# Patient Record
Sex: MALE
[De-identification: names, ages, dates, MRNs, and addresses within clinical notes are randomized per-mention and may not be internally consistent; named-entity substitution may affect disease eponyms.]

---

## 2024-08-05 PROBLEM — Z00.00 ENCOUNTER FOR PREVENTIVE HEALTH EXAMINATION: Status: ACTIVE | Noted: 2024-08-05

## 2024-08-06 ENCOUNTER — APPOINTMENT (OUTPATIENT)
Dept: PULMONOLOGY | Facility: CLINIC | Age: 34
End: 2024-08-06

## 2024-08-06 PROBLEM — Z02.1 PRE-EMPLOYMENT EXAMINATION: Status: ACTIVE | Noted: 2024-08-06

## 2024-08-06 PROCEDURE — 99204 OFFICE O/P NEW MOD 45 MIN: CPT | Mod: 25

## 2024-08-06 PROCEDURE — 36415 COLL VENOUS BLD VENIPUNCTURE: CPT

## 2024-08-06 NOTE — PLAN
[FreeTextEntry1] : Venipuncture with labs drawn in office. Preemployment physical form filled for Sven. Advised him to return for annual physical examination in 2 months.

## 2024-08-06 NOTE — HISTORY OF PRESENT ILLNESS
[FreeTextEntry8] : Sven is a pleasant 34-year-old gentleman without significant past medical history, he came in for preemployment physical today.  He has no complaints.  He needs to have pain while in physical paper filled. He is a non-smoker.

## 2024-10-16 ENCOUNTER — APPOINTMENT (OUTPATIENT)
Dept: PULMONOLOGY | Facility: CLINIC | Age: 34
End: 2024-10-16

## 2024-12-09 ENCOUNTER — EMERGENCY (EMERGENCY)
Facility: HOSPITAL | Age: 34
LOS: 0 days | Discharge: HOME | End: 2024-12-10
Attending: EMERGENCY MEDICINE
Payer: MEDICAID

## 2024-12-09 VITALS
SYSTOLIC BLOOD PRESSURE: 146 MMHG | WEIGHT: 169.98 LBS | OXYGEN SATURATION: 100 % | HEART RATE: 99 BPM | HEIGHT: 69 IN | RESPIRATION RATE: 16 BRPM | TEMPERATURE: 98 F | DIASTOLIC BLOOD PRESSURE: 107 MMHG

## 2024-12-09 VITALS
DIASTOLIC BLOOD PRESSURE: 85 MMHG | HEART RATE: 83 BPM | RESPIRATION RATE: 18 BRPM | OXYGEN SATURATION: 98 % | TEMPERATURE: 99 F | SYSTOLIC BLOOD PRESSURE: 136 MMHG

## 2024-12-09 DIAGNOSIS — M25.511 PAIN IN RIGHT SHOULDER: ICD-10-CM
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
## 2024-12-09 DIAGNOSIS — S43.004A UNSPECIFIED DISLOCATION OF RIGHT SHOULDER JOINT, INITIAL ENCOUNTER: ICD-10-CM
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
## 2024-12-09 PROCEDURE — 73030 X-RAY EXAM OF SHOULDER: CPT | Mod: 26,RT

## 2024-12-09 PROCEDURE — 23650 CLTX SHO DSLC W/MNPJ WO ANES: CPT | Mod: 54,RT

## 2024-12-09 PROCEDURE — 99285 EMERGENCY DEPT VISIT HI MDM: CPT | Mod: 57

## 2024-12-09 RX ORDER — IBUPROFEN 200 MG
600 TABLET ORAL ONCE
Refills: 0 | Status: COMPLETED | OUTPATIENT
Start: 2024-12-09 | End: 2024-12-09

## 2024-12-09 RX ADMIN — Medication 600 MILLIGRAM(S): at 22:44

## 2024-12-09 RX ADMIN — Medication 8 MILLIGRAM(S): at 22:45

## 2024-12-09 RX ADMIN — Medication 8 MILLIGRAM(S): at 23:44

## 2024-12-09 RX ADMIN — Medication 600 MILLIGRAM(S): at 23:44

## 2024-12-09 NOTE — ED PROVIDER NOTE - NSFOLLOWUPINSTRUCTIONS_ED_ALL_ED_FT
1) Keep immobilizer in place for 1-2 days  2) Follow-up with Orthopedics  3) Follow up with your primary care doctor  4) Return to the ER for worsening or concerning symptoms

## 2024-12-09 NOTE — ED PROVIDER NOTE - OBJECTIVE STATEMENT
This patient is a 34 year old man right hand dominant who presents to the ER c/o right shoulder pain.  He extended his arm backward as he was moving object and felt the shoulder dislocate.  He reports constant 9/10 in severity worse with movement.

## 2024-12-09 NOTE — ED PROVIDER NOTE - PATIENT PORTAL LINK FT
You can access the FollowMyHealth Patient Portal offered by [facility] by registering at the following website: [email or web address]. By joining [employer]’s FollowMyHealth portal, you will also be able to view your health information using other applications (apps) compatible with our system.

## 2024-12-09 NOTE — ED ADULT TRIAGE NOTE - CHIEF COMPLAINT QUOTE
Patient states that he was moving objects around and felt his right shoulder slip out of place. No  pain medications taken prior to arrival

## 2024-12-09 NOTE — ED PROVIDER NOTE - CARE PROVIDER_API CALL
Danny Cunha  Orthopaedic Sports Medicine  [address] [place] [address], [address] [address]  Phone: [phone]  Fax: [phone]  Follow Up Time:

## 2024-12-09 NOTE — ED ADULT NURSE NOTE - OBJECTIVE STATEMENT
33 yo m no pmhx, nkda c/o R shoulder pain / dislocation after stretching the wrong way. Pt aox3, amb w steady gait. Pt had R arm sling on arrival. Pt c/o R upper extremity DROM, subjective numbness/ tinglign + cap refill. Did not take any pain meds PTA to ED.

## 2024-12-09 NOTE — ED PROVIDER NOTE - CLINICAL SUMMARY MEDICAL DECISION MAKING FREE TEXT BOX
Right shoulder pain after extending arm posteriorly.  No acute distress vitals stable deformity noted at shoulder high suspicion for dislocation.  Will give medication for pain and get Xray to confirm dislocation and r/o fracture Right shoulder pain after extending arm posteriorly.  No acute distress vitals stable deformity noted at shoulder high suspicion for dislocation.  Will give medication for pain and get Xray to confirm dislocation and r/o fracture    Xray + for dislocation  Shoulder reduced and confirmed by Xray    Supportive care and follow-up discussed.

## 2024-12-10 PROCEDURE — 73030 X-RAY EXAM OF SHOULDER: CPT | Mod: 26,RT

## 2024-12-10 NOTE — ED ADULT NURSE REASSESSMENT NOTE - NS ED NURSE REASSESS COMMENT FT1
RN made aware that pt was not in bed. Pt aox3, amb w steady gait. had verbalized that R shoulder feels better and with ROM, Attempted to call Mobile # as in file, and no . MD made aware of situation.

## 2025-04-28 ENCOUNTER — EMERGENCY (EMERGENCY)
Facility: HOSPITAL | Age: 35
LOS: 0 days | Discharge: HOME | End: 2025-04-28
Attending: STUDENT IN AN ORGANIZED HEALTH CARE EDUCATION/TRAINING PROGRAM
Payer: COMMERCIAL

## 2025-04-28 VITALS
RESPIRATION RATE: 14 BRPM | DIASTOLIC BLOOD PRESSURE: 78 MMHG | OXYGEN SATURATION: 99 % | SYSTOLIC BLOOD PRESSURE: 119 MMHG | HEART RATE: 66 BPM | TEMPERATURE: 98 F

## 2025-04-28 VITALS
WEIGHT: 167.99 LBS | OXYGEN SATURATION: 100 % | DIASTOLIC BLOOD PRESSURE: 79 MMHG | TEMPERATURE: 99 F | HEIGHT: 69 IN | RESPIRATION RATE: 16 BRPM | HEART RATE: 82 BPM | SYSTOLIC BLOOD PRESSURE: 119 MMHG

## 2025-04-28 DIAGNOSIS — S22.41XA MULTIPLE FRACTURES OF RIBS, RIGHT SIDE, INITIAL ENCOUNTER FOR CLOSED FRACTURE: ICD-10-CM

## 2025-04-28 DIAGNOSIS — M62.838 OTHER MUSCLE SPASM: ICD-10-CM
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
## 2025-04-28 DIAGNOSIS — M25.511 PAIN IN RIGHT SHOULDER: ICD-10-CM
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
## 2025-04-28 DIAGNOSIS — R07.89 OTHER CHEST PAIN: ICD-10-CM

## 2025-04-28 PROCEDURE — 93010 ELECTROCARDIOGRAM REPORT: CPT

## 2025-04-28 PROCEDURE — 73030 X-RAY EXAM OF SHOULDER: CPT | Mod: 26,RT

## 2025-04-28 PROCEDURE — 99284 EMERGENCY DEPT VISIT MOD MDM: CPT

## 2025-04-28 PROCEDURE — 71101 X-RAY EXAM UNILAT RIBS/CHEST: CPT | Mod: 26,RT

## 2025-04-28 PROCEDURE — 71046 X-RAY EXAM CHEST 2 VIEWS: CPT | Mod: 26

## 2025-04-28 RX ORDER — IBUPROFEN 200 MG
1 TABLET ORAL
Qty: 15 | Refills: 0
Start: 2025-04-28 | End: 2025-05-02

## 2025-04-28 RX ORDER — LIDOCAINE HYDROCHLORIDE 20 MG/ML
1 JELLY TOPICAL
Qty: 1 | Refills: 0
Start: 2025-04-28 | End: 2025-04-30

## 2025-04-28 NOTE — ED PROVIDER NOTE - PATIENT PORTAL LINK FT
You can access the FollowMyHealth Patient Portal offered by [facility] by registering at the following website: [email or web address]. By joining [facility]’s FollowMyHealth portal, you will also be able to view your health information using other applications (apps) compatible with our system.

## 2025-04-28 NOTE — ED ADULT TRIAGE NOTE - BMI (KG/M2)
History of renal stent  prior insertion in Oct 2014 and exchanged in   right side - Jan 17th , 2015  S/P cystoscopy  Oct 2014  Status post spinal disc removal  C-5 C-6 removal
24.8

## 2025-04-28 NOTE — ED PROVIDER NOTE - ATTENDING APP SHARED VISIT CONTRIBUTION OF CARE
I have personally performed a face to face medical and diagnostic evaluation of the patient. I have discussed with and reviewed the KIRILL's note and agree with the History, ROS, Physical Exam and MDM unless otherwise indicated. A brief summary of my personal evaluation and impression can be found below. I actively participated in the comanagement of this patient with the KIRILL. I have personally reviewed all orders, study/imaging results, medication orders. I discussed indications for consultant evaluation and consultant recommendations with the KIRILL when applicable, and have discussed the disposition plan with the KIRILL.    Allen WOODARD: Please see the HPI, ROS, PE and MDM as authored by me.

## 2025-04-28 NOTE — ED PROVIDER NOTE - PROGRESS NOTE DETAILS
NORBERTO Haque NP: X-ray shows fracture of right 6th/8th ribs, no pneumothorax, lung sounds equal and clear bilaterally.  Shoulder negative for dislocation/fracture.  Patient well-appearing in no acute distress.  Will DC patient with outpatient PMD follow-up.  Patient updated results and is agreeable to plan, questions answered understanding verbalized, return precautions given.
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
## 2025-04-28 NOTE — ED PROVIDER NOTE - PHYSICAL EXAMINATION
Allen WOODARD:  VITALS: Initial triage and subsequent vitals have been reviewed by me.  GEN APPEARANCE: Alert, cooperative. Non-toxic appearing. Well appearing. NAD.  HEAD: Atraumatic, normocephalic   EYES: PERRLa, EOMI, vision grossly intact.   EARS: Gross hearing intact.   NOSE: No nasal discharge, no external evidence of epistaxis.   NECK: Supple  CV: RRR, S1S2, no c/r/m/g. No cyanosis. Extremities warm, well perfused. Cap refill <2 seconds. No bruits.   LUNGS: CTAB. No wheezing. No rales. No rhonchi. No diminished breath sounds.   ABDOMEN: Soft, NTND. No guarding or rebound. No masses.   MSK/EXT: R trap in spasm, mild R anterior/lateral chest wall tenderness, Spine appears normal, no spine point tenderness. No CVA ttp. Normal muscular development. Pelvis stable. No obvious joint or bony deformity, no peripheral edema.   NEURO: Alert, follows commands. Weight bearing normal. Speech normal. Sensation and motor normal x4 extremities.   SKIN: Normal color for race, warm, dry and intact. No evidence of rash.  PSYCH: Normal mood and affect.
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
## 2025-08-08 ENCOUNTER — EMERGENCY (EMERGENCY)
Facility: HOSPITAL | Age: 35
LOS: 1 days | End: 2025-08-08
Attending: EMERGENCY MEDICINE
Payer: SELF-PAY

## 2025-08-08 VITALS
RESPIRATION RATE: 18 BRPM | OXYGEN SATURATION: 99 % | SYSTOLIC BLOOD PRESSURE: 146 MMHG | HEART RATE: 83 BPM | TEMPERATURE: 98 F | DIASTOLIC BLOOD PRESSURE: 66 MMHG

## 2025-08-08 VITALS
SYSTOLIC BLOOD PRESSURE: 129 MMHG | HEART RATE: 89 BPM | DIASTOLIC BLOOD PRESSURE: 85 MMHG | RESPIRATION RATE: 16 BRPM | OXYGEN SATURATION: 100 % | WEIGHT: 164.91 LBS | TEMPERATURE: 98 F | HEIGHT: 70 IN

## 2025-08-08 PROCEDURE — 99284 EMERGENCY DEPT VISIT MOD MDM: CPT | Mod: 57

## 2025-08-08 PROCEDURE — 73020 X-RAY EXAM OF SHOULDER: CPT | Mod: 26,RT

## 2025-08-08 PROCEDURE — 73030 X-RAY EXAM OF SHOULDER: CPT | Mod: 26,RT

## 2025-08-08 PROCEDURE — 73020 X-RAY EXAM OF SHOULDER: CPT

## 2025-08-08 PROCEDURE — 23650 CLTX SHO DSLC W/MNPJ WO ANES: CPT | Mod: RT

## 2025-08-08 PROCEDURE — 23650 CLTX SHO DSLC W/MNPJ WO ANES: CPT | Mod: 54,RT

## 2025-08-08 PROCEDURE — 99053 MED SERV 10PM-8AM 24 HR FAC: CPT

## 2025-08-08 PROCEDURE — 99284 EMERGENCY DEPT VISIT MOD MDM: CPT | Mod: 25

## 2025-08-08 PROCEDURE — 73030 X-RAY EXAM OF SHOULDER: CPT

## 2025-08-08 RX ORDER — IBUPROFEN 200 MG
600 TABLET ORAL ONCE
Refills: 0 | Status: COMPLETED | OUTPATIENT
Start: 2025-08-08 | End: 2025-08-08

## 2025-08-08 RX ADMIN — Medication 600 MILLIGRAM(S): at 03:51
